# Patient Record
Sex: MALE | Race: BLACK OR AFRICAN AMERICAN | NOT HISPANIC OR LATINO
[De-identification: names, ages, dates, MRNs, and addresses within clinical notes are randomized per-mention and may not be internally consistent; named-entity substitution may affect disease eponyms.]

---

## 2021-11-22 ENCOUNTER — APPOINTMENT (OUTPATIENT)
Dept: OPHTHALMOLOGY | Facility: CLINIC | Age: 36
End: 2021-11-22
Payer: MEDICAID

## 2021-11-22 ENCOUNTER — NON-APPOINTMENT (OUTPATIENT)
Age: 36
End: 2021-11-22

## 2021-11-22 PROCEDURE — 92004 COMPRE OPH EXAM NEW PT 1/>: CPT

## 2021-11-22 PROCEDURE — 99072 ADDL SUPL MATRL&STAF TM PHE: CPT

## 2022-07-13 PROBLEM — Z00.00 ENCOUNTER FOR PREVENTIVE HEALTH EXAMINATION: Status: ACTIVE | Noted: 2022-07-13

## 2022-07-27 ENCOUNTER — EMERGENCY (EMERGENCY)
Facility: HOSPITAL | Age: 37
LOS: 0 days | Discharge: HOME | End: 2022-07-28
Attending: EMERGENCY MEDICINE

## 2022-07-27 VITALS
OXYGEN SATURATION: 99 % | DIASTOLIC BLOOD PRESSURE: 72 MMHG | TEMPERATURE: 98 F | SYSTOLIC BLOOD PRESSURE: 111 MMHG | HEART RATE: 72 BPM | RESPIRATION RATE: 18 BRPM

## 2022-07-27 VITALS
HEART RATE: 82 BPM | TEMPERATURE: 99 F | OXYGEN SATURATION: 97 % | SYSTOLIC BLOOD PRESSURE: 113 MMHG | DIASTOLIC BLOOD PRESSURE: 75 MMHG | WEIGHT: 259.93 LBS | HEIGHT: 73 IN | RESPIRATION RATE: 18 BRPM
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
## 2022-07-27 DIAGNOSIS — R09.89 OTHER SPECIFIED SYMPTOMS AND SIGNS INVOLVING THE CIRCULATORY AND RESPIRATORY SYSTEMS: ICD-10-CM
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
## 2022-07-27 DIAGNOSIS — S05.01XA INJURY OF CONJUNCTIVA AND CORNEAL ABRASION WITHOUT FOREIGN BODY, RIGHT EYE, INITIAL ENCOUNTER: ICD-10-CM
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
## 2022-07-27 PROCEDURE — 99283 EMERGENCY DEPT VISIT LOW MDM: CPT

## 2022-07-27 NOTE — ED ADULT TRIAGE NOTE - CHIEF COMPLAINT QUOTE
c/o r eye foreign body sensation was welding metal today flushed eye but discomfort persists denies visual disturbance

## 2022-07-28 RX ORDER — CIPROFLOXACIN HCL 0.3 %
0.5 DROPS OPHTHALMIC (EYE)
Qty: 1 | Refills: 0
Start: 2022-07-28 | End: 2022-08-01

## 2022-07-28 RX ORDER — IBUPROFEN 200 MG
600 TABLET ORAL ONCE
Refills: 0 | Status: COMPLETED | OUTPATIENT
Start: 2022-07-28 | End: 2022-07-28

## 2022-07-28 RX ORDER — FLUORESCEIN SODIUM 9 MG
1 STRIP OPHTHALMIC (EYE) ONCE
Refills: 0 | Status: COMPLETED | OUTPATIENT
Start: 2022-07-28 | End: 2022-07-28

## 2022-07-28 RX ORDER — IBUPROFEN 200 MG
1 TABLET ORAL
Qty: 20 | Refills: 0
Start: 2022-07-28 | End: 2022-08-01

## 2022-07-28 RX ADMIN — Medication 1 APPLICATION(S): at 01:05

## 2022-07-28 RX ADMIN — Medication 1 DROP(S): at 01:05

## 2022-07-28 NOTE — ED PROVIDER NOTE - PATIENT PORTAL LINK FT
You can access the FollowMyHealth Patient Portal offered by [facility] by registering at the following website: [email or web address]. By joining [facility]’s FollowMyHealth portal, you will also be able to view your health information using other applications (apps) compatible with our system.

## 2022-07-28 NOTE — ED PROVIDER NOTE - NSFOLLOWUPCLINICS_GEN_ALL_ED_FT
[facility] - Ophthalmology  Ophthalmology  [address]  [place] [address]  Phone: [phone]  Fax:   Follow Up Time: Urgent

## 2022-07-28 NOTE — ED ADULT NURSE REASSESSMENT NOTE - NS ED NURSE REASSESS COMMENT FT1
received report from covering RN, prior to dc, patient was not seen at bedside, as per md, patient left, no dc instructions given
pt is alert and oriented x3. no acute distress noted.

## 2022-07-28 NOTE — ED PROVIDER NOTE - CLINICAL SUMMARY MEDICAL DECISION MAKING FREE TEXT BOX
36 yo M with R corneal abrasion, FB not visualized   -motrin, cipro drops  -d/c with urgent ophtho f/u--pt. encouraged to go to ophtho clinic first thing this morning

## 2022-07-28 NOTE — ED PROVIDER NOTE - PHYSICAL EXAMINATION
Vitals: WNL  Gen: AAOx3, NAD, sitting comfortably in stretcher  Head: ncat, perrla, eomi b/l, injected conjunctiva of R eye, 1 mm corneal abrasion over R pupil, negative Zach sign, no periorbital swelling/edema, normal vision b/l   Neck: supple, no lymphadenopathy, no midline deviation  Heart: rrr, no m/r/g  Lungs: CTA b/l, no rales/ronchi/wheezes  Abd: soft, nontender, non-distended, no rebound or guarding  Ext: no clubbing/cyanosis/edema  Neuro: sensation and muscle strength intact b/l, steady gait Vitals: WNL  Gen: AAOx3, NAD, sitting comfortably in stretcher  Head: ncat, perrla, eomi b/l, injected conjunctiva of R eye, 1 mm corneal abrasion over R pupil, negative Zach sign, no periorbital swelling/edema, normal vision b/l, normal/equal finger discrimination b/l, no corneal or conjunctival FB appreciated on exam  Neck: supple, no lymphadenopathy, no midline deviation  Heart: rrr, no m/r/g  Lungs: CTA b/l, no rales/ronchi/wheezes  Abd: soft, nontender, non-distended, no rebound or guarding  Ext: no clubbing/cyanosis/edema  Neuro: sensation and muscle strength intact b/l, steady gait

## 2022-07-28 NOTE — ED PROVIDER NOTE - OBJECTIVE STATEMENT
38 yo M with sensation of FB in R eye.  Pt. was welding and saw a piece something flick back into R eye.  Wife saw something over his R pupil.  Pt. went ot shower and after that the speck disappeared, but pt.'s R eye is still watering and feeling irritated.  Normal vision, no bleeding noted, no other trauma.  Pt. feels otherwise well.   ROS: negative for fever, cough, headache, chest pain, shortness of breath, abd pain, nausea, vomiting, diarrhea, rash, paresthesia, and weakness--all other systems reviewed are negative.   PMH: negative; Meds: Denies; SH: Denies smoking/drinking/drug use

## 2023-06-26 ENCOUNTER — RX ONLY (RX ONLY)
Age: 38
End: 2023-06-26

## 2023-06-26 ENCOUNTER — OFFICE (OUTPATIENT)
Dept: URBAN - METROPOLITAN AREA CLINIC 35 | Facility: CLINIC | Age: 38
Setting detail: OPHTHALMOLOGY
End: 2023-06-26
Payer: COMMERCIAL

## 2023-06-26 DIAGNOSIS — H40.013: ICD-10-CM

## 2023-06-26 DIAGNOSIS — H16.223: ICD-10-CM

## 2023-06-26 DIAGNOSIS — H11.043: ICD-10-CM

## 2023-06-26 DIAGNOSIS — H11.151: ICD-10-CM

## 2023-06-26 PROCEDURE — 92002 INTRM OPH EXAM NEW PATIENT: CPT | Performed by: OPHTHALMOLOGY

## 2023-06-26 ASSESSMENT — REFRACTION_AUTOREFRACTION
OD_CYLINDER: -0.75
OS_SPHERE: -0.50
OS_AXIS: 058
OS_CYLINDER: -0.75
OD_SPHERE: -0.25
OD_AXIS: 091

## 2023-06-26 ASSESSMENT — REFRACTION_MANIFEST
OD_SPHERE: -0.25
OS_SPHERE: -0.25
OD_VA1: 20/20
OS_AXIS: 055
OS_CYLINDER: -0.75
OD_AXIS: 090
OS_VA1: 20/25-1
OD_CYLINDER: -0.75

## 2023-06-26 ASSESSMENT — SUPERFICIAL PUNCTATE KERATITIS (SPK)
OS_SPK: 1+
OD_SPK: 1+

## 2023-06-26 ASSESSMENT — AXIALLENGTH_DERIVED
OD_AL: 23.6058
OS_AL: 23.4743
OS_AL: 23.3782
OD_AL: 23.6058

## 2023-06-26 ASSESSMENT — SPHEQUIV_DERIVED
OS_SPHEQUIV: -0.875
OD_SPHEQUIV: -0.625
OD_SPHEQUIV: -0.625
OS_SPHEQUIV: -0.625

## 2023-06-26 ASSESSMENT — KERATOMETRY
OD_AXISANGLE_DEGREES: 085
OS_K1POWER_DIOPTERS: 44.25
OD_K1POWER_DIOPTERS: 44.00
OS_AXISANGLE_DEGREES: 105
OS_K2POWER_DIOPTERS: 45.25
OD_K2POWER_DIOPTERS: 44.25

## 2023-06-26 ASSESSMENT — VISUAL ACUITY
OS_BCVA: 20/25+2
OD_BCVA: 20/30

## 2023-06-26 ASSESSMENT — INCREASING TEAR LAKE - SEVERITY SCORE: OS_INC_TEARLAKE: 1+ 2+

## 2023-06-26 ASSESSMENT — CORNEAL PTERYGIUM
OS_PTERYGIUM: NASAL
OD_PTERYGIUM: NASAL

## 2023-07-03 ENCOUNTER — OFFICE (OUTPATIENT)
Dept: URBAN - METROPOLITAN AREA CLINIC 35 | Facility: CLINIC | Age: 38
Setting detail: OPHTHALMOLOGY
End: 2023-07-03
Payer: COMMERCIAL

## 2023-07-03 DIAGNOSIS — H40.013: ICD-10-CM

## 2023-07-03 DIAGNOSIS — H16.223: ICD-10-CM

## 2023-07-03 DIAGNOSIS — H11.043: ICD-10-CM

## 2023-07-03 DIAGNOSIS — H11.151: ICD-10-CM

## 2023-07-03 PROCEDURE — 76514 ECHO EXAM OF EYE THICKNESS: CPT | Performed by: OPHTHALMOLOGY

## 2023-07-03 PROCEDURE — 92133 CPTRZD OPH DX IMG PST SGM ON: CPT | Performed by: OPHTHALMOLOGY

## 2023-07-03 PROCEDURE — 92014 COMPRE OPH EXAM EST PT 1/>: CPT | Performed by: OPHTHALMOLOGY

## 2023-07-03 PROCEDURE — 92083 EXTENDED VISUAL FIELD XM: CPT | Performed by: OPHTHALMOLOGY

## 2023-07-03 ASSESSMENT — AXIALLENGTH_DERIVED
OD_AL: 23.6575
OS_AL: 23.4687
OD_AL: 23.6575
OS_AL: 23.5655
OS_AL: 23.325
OD_AL: 23.56

## 2023-07-03 ASSESSMENT — PACHYMETRY
OS_CT_CORRECTION: -1
OD_CT_UM: 553
OD_CT_CORRECTION: -1
OS_CT_UM: 554

## 2023-07-03 ASSESSMENT — CORNEAL PTERYGIUM
OS_PTERYGIUM: NASAL
OD_PTERYGIUM: NASAL

## 2023-07-03 ASSESSMENT — REFRACTION_AUTOREFRACTION
OD_CYLINDER: -0.75
OD_SPHERE: -0.50
OS_AXIS: 070
OD_AXIS: 099
OS_SPHERE: -0.50
OS_CYLINDER: -0.75

## 2023-07-03 ASSESSMENT — KERATOMETRY
OS_K1POWER_DIOPTERS: 44.00
OS_AXISANGLE_DEGREES: 111
OD_K1POWER_DIOPTERS: 44.00
OD_K2POWER_DIOPTERS: 44.50
OD_AXISANGLE_DEGREES: 081
OS_K2POWER_DIOPTERS: 45.00

## 2023-07-03 ASSESSMENT — SUPERFICIAL PUNCTATE KERATITIS (SPK)
OD_SPK: 1+
OS_SPK: 1+

## 2023-07-03 ASSESSMENT — REFRACTION_MANIFEST
OS_SPHERE: +0.25
OD_VA1: 20/20
OD_VA1: 20/20
OS_VA1: 20/25+1
OD_CYLINDER: -0.75
OS_AXIS: 055
OS_AXIS: 070
OD_AXIS: 090
OS_VA1: 20/25-1
OS_CYLINDER: -0.75
OD_CYLINDER: -0.75
OS_CYLINDER: -1.00
OD_AXIS: 099
OD_SPHERE: -0.50
OD_SPHERE: -0.25
OS_SPHERE: -0.25

## 2023-07-03 ASSESSMENT — SPHEQUIV_DERIVED
OS_SPHEQUIV: -0.625
OD_SPHEQUIV: -0.875
OS_SPHEQUIV: -0.25
OD_SPHEQUIV: -0.875
OS_SPHEQUIV: -0.875
OD_SPHEQUIV: -0.625

## 2023-07-03 ASSESSMENT — VISUAL ACUITY
OD_BCVA: 20/30+2
OS_BCVA: 20/30-2+1

## 2023-07-03 ASSESSMENT — CONFRONTATIONAL VISUAL FIELD TEST (CVF)
OD_FINDINGS: FULL
OS_FINDINGS: FULL

## 2023-07-03 ASSESSMENT — TONOMETRY
OS_IOP_MMHG: 17
OD_IOP_MMHG: 17

## 2023-07-03 ASSESSMENT — INCREASING TEAR LAKE - SEVERITY SCORE: OS_INC_TEARLAKE: 1+ 2+

## 2023-12-29 ENCOUNTER — APPOINTMENT (OUTPATIENT)
Dept: OBGYN | Facility: CLINIC | Age: 38
End: 2023-12-29
Payer: MEDICAID

## 2023-12-29 DIAGNOSIS — Z23 ENCOUNTER FOR IMMUNIZATION: ICD-10-CM

## 2023-12-29 PROCEDURE — 90472 IMMUNIZATION ADMIN EACH ADD: CPT

## 2023-12-29 PROCEDURE — 90686 IIV4 VACC NO PRSV 0.5 ML IM: CPT

## 2023-12-29 PROCEDURE — 90715 TDAP VACCINE 7 YRS/> IM: CPT

## 2023-12-29 PROCEDURE — G0008: CPT

## 2024-09-20 ENCOUNTER — DOCTOR'S OFFICE (OUTPATIENT)
Facility: LOCATION | Age: 39
Setting detail: OPHTHALMOLOGY
End: 2024-09-20
Payer: COMMERCIAL

## 2024-09-20 DIAGNOSIS — T15.02XD: ICD-10-CM

## 2024-09-20 DIAGNOSIS — T15.02X: ICD-10-CM

## 2024-09-20 PROCEDURE — 92012 INTRM OPH EXAM EST PATIENT: CPT | Performed by: OPHTHALMOLOGY

## 2024-09-20 PROCEDURE — 65222 REMOVE FOREIGN BODY FROM EYE: CPT | Mod: LT | Performed by: OPHTHALMOLOGY

## 2024-09-27 ENCOUNTER — DOCTOR'S OFFICE (OUTPATIENT)
Facility: LOCATION | Age: 39
Setting detail: OPHTHALMOLOGY
End: 2024-09-27
Payer: COMMERCIAL

## 2024-09-27 DIAGNOSIS — H17.9: ICD-10-CM

## 2024-09-27 PROBLEM — H16.222 DRY EYE SYNDROME K SICCA;  , LEFT EYE: Status: ACTIVE | Noted: 2024-09-27

## 2024-09-27 PROBLEM — H11.152 PINGUECULA;  , LEFT EYE: Status: ACTIVE | Noted: 2024-09-27

## 2024-09-27 PROBLEM — H11.042 PTERYGIUM-PERIPHERAL;  , LEFT EYE: Status: ACTIVE | Noted: 2024-09-27

## 2024-09-27 PROCEDURE — 92012 INTRM OPH EXAM EST PATIENT: CPT | Performed by: OPHTHALMOLOGY

## 2024-11-05 ENCOUNTER — DOCTOR'S OFFICE (OUTPATIENT)
Facility: LOCATION | Age: 39
Setting detail: OPHTHALMOLOGY
End: 2024-11-05
Payer: COMMERCIAL

## 2024-11-05 DIAGNOSIS — H17.9: ICD-10-CM

## 2024-11-05 PROCEDURE — 92012 INTRM OPH EXAM EST PATIENT: CPT | Performed by: OPHTHALMOLOGY

## 2024-11-05 ASSESSMENT — KERATOMETRY
OD_K2POWER_DIOPTERS: 44.75
OD_AXISANGLE_DEGREES: 083
OS_K2POWER_DIOPTERS: 45.00
OD_K1POWER_DIOPTERS: 44.00
OS_AXISANGLE_DEGREES: 111
OS_K1POWER_DIOPTERS: 44.00

## 2024-11-05 ASSESSMENT — REFRACTION_MANIFEST
OS_CYLINDER: -0.75
OD_SPHERE: -0.50
OD_SPHERE: -0.25
OD_AXIS: 090
OS_VA1: 20/25+1
OS_SPHERE: -0.25
OS_SPHERE: +0.25
OD_AXIS: 099
OS_VA1: 20/25-1
OS_CYLINDER: -1.00
OD_VA1: 20/20
OS_AXIS: 070
OD_VA1: 20/20
OD_CYLINDER: -0.75
OS_AXIS: 055
OD_CYLINDER: -0.75

## 2024-11-05 ASSESSMENT — CORNEAL PTERYGIUM
OS_PTERYGIUM: NASAL
OD_PTERYGIUM: NASAL

## 2024-11-05 ASSESSMENT — VISUAL ACUITY
OS_BCVA: 20/20-1
OD_BCVA: 20/20-2

## 2024-11-05 ASSESSMENT — REFRACTION_AUTOREFRACTION
OS_SPHERE: -1.75
OD_SPHERE: -1.25
OS_AXIS: 146
OS_CYLINDER: +0.75
OD_AXIS: 010
OD_CYLINDER: +0.50

## 2024-11-21 ENCOUNTER — TELEHEALTH-OTHER THEN HOME (OUTPATIENT)
Dept: URBAN - METROPOLITAN AREA CLINIC 35 | Facility: CLINIC | Age: 39
Setting detail: OPHTHALMOLOGY
End: 2024-11-21
Payer: COMMERCIAL

## 2024-11-21 DIAGNOSIS — H16.101: ICD-10-CM

## 2024-11-21 PROBLEM — H11.151 PINGUECULA;  ,, RIGHT EYE: Status: ACTIVE | Noted: 2024-11-21

## 2024-11-21 PROBLEM — H11.041: Status: ACTIVE | Noted: 2024-11-21

## 2024-11-21 PROCEDURE — 92012 INTRM OPH EXAM EST PATIENT: CPT | Performed by: OPHTHALMOLOGY

## 2024-11-21 ASSESSMENT — REFRACTION_MANIFEST
OD_AXIS: 090
OD_CYLINDER: -0.75
OD_AXIS: 099
OS_SPHERE: +0.25
OS_AXIS: 070
OD_SPHERE: -0.25
OS_VA1: 20/25+1
OS_AXIS: 055
OS_CYLINDER: -0.75
OS_SPHERE: -0.25
OD_CYLINDER: -0.75
OD_VA1: 20/20
OD_SPHERE: -0.50
OD_VA1: 20/20
OS_CYLINDER: -1.00
OS_VA1: 20/25-1

## 2024-11-21 ASSESSMENT — REFRACTION_AUTOREFRACTION
OD_AXIS: 175
OS_SPHERE: -1.75
OS_AXIS: 144
OS_CYLINDER: +0.75
OD_SPHERE: -1.00
OD_CYLINDER: -0.50

## 2024-11-21 ASSESSMENT — CORNEAL PTERYGIUM
OS_PTERYGIUM: NASAL
OD_PTERYGIUM: NASAL

## 2024-11-21 ASSESSMENT — KERATOMETRY
OD_K2POWER_DIOPTERS: 45.25
OS_AXISANGLE_DEGREES: 105
OD_AXISANGLE_DEGREES: 091
OS_K2POWER_DIOPTERS: 45.00
OD_K1POWER_DIOPTERS: 44.50
OS_K1POWER_DIOPTERS: 44.25

## 2024-11-21 ASSESSMENT — CONFRONTATIONAL VISUAL FIELD TEST (CVF)
OS_FINDINGS: FULL
OD_FINDINGS: FULL

## 2024-11-21 ASSESSMENT — VISUAL ACUITY
OD_BCVA: 20/20-2
OS_BCVA: 20/20-

## 2025-04-01 ENCOUNTER — APPOINTMENT (OUTPATIENT)
Dept: COLORECTAL SURGERY | Facility: CLINIC | Age: 40
End: 2025-04-01
Payer: COMMERCIAL

## 2025-04-01 VITALS
TEMPERATURE: 98 F | BODY MASS INDEX: 28.1 KG/M2 | SYSTOLIC BLOOD PRESSURE: 144 MMHG | RESPIRATION RATE: 16 BRPM | DIASTOLIC BLOOD PRESSURE: 79 MMHG | OXYGEN SATURATION: 99 % | WEIGHT: 212 LBS | HEIGHT: 73 IN | HEART RATE: 80 BPM

## 2025-04-01 DIAGNOSIS — K60.2 ANAL FISSURE, UNSPECIFIED: ICD-10-CM

## 2025-04-01 DIAGNOSIS — K64.9 UNSPECIFIED HEMORRHOIDS: ICD-10-CM

## 2025-04-01 DIAGNOSIS — K62.5 HEMORRHAGE OF ANUS AND RECTUM: ICD-10-CM

## 2025-04-01 DIAGNOSIS — Z78.9 OTHER SPECIFIED HEALTH STATUS: ICD-10-CM

## 2025-04-01 DIAGNOSIS — K62.89 OTHER SPECIFIED DISEASES OF ANUS AND RECTUM: ICD-10-CM

## 2025-04-01 PROCEDURE — 99203 OFFICE O/P NEW LOW 30 MIN: CPT | Mod: 25

## 2025-04-01 PROCEDURE — 46600 DIAGNOSTIC ANOSCOPY SPX: CPT
